# Patient Record
Sex: FEMALE | Race: OTHER | HISPANIC OR LATINO | URBAN - METROPOLITAN AREA
[De-identification: names, ages, dates, MRNs, and addresses within clinical notes are randomized per-mention and may not be internally consistent; named-entity substitution may affect disease eponyms.]

---

## 2019-07-28 ENCOUNTER — EMERGENCY (EMERGENCY)
Facility: HOSPITAL | Age: 54
LOS: 1 days | Discharge: HOME | End: 2019-07-28
Attending: EMERGENCY MEDICINE
Payer: MEDICAID

## 2019-07-28 VITALS
RESPIRATION RATE: 20 BRPM | HEART RATE: 73 BPM | OXYGEN SATURATION: 98 % | WEIGHT: 227.96 LBS | SYSTOLIC BLOOD PRESSURE: 155 MMHG | TEMPERATURE: 98 F | DIASTOLIC BLOOD PRESSURE: 78 MMHG | HEIGHT: 64 IN

## 2019-07-28 PROCEDURE — 99283 EMERGENCY DEPT VISIT LOW MDM: CPT

## 2019-07-28 NOTE — ED ADULT TRIAGE NOTE - CHIEF COMPLAINT QUOTE
c/o of bump to left eye x 6mos with increase in size and pain, itchiness, tearing, and associated HA

## 2019-07-29 PROCEDURE — T1013: CPT

## 2019-07-29 PROCEDURE — 99283 EMERGENCY DEPT VISIT LOW MDM: CPT

## 2019-07-29 RX ORDER — ERYTHROMYCIN BASE 5 MG/GRAM
1 OINTMENT (GRAM) OPHTHALMIC (EYE)
Qty: 1 | Refills: 0
Start: 2019-07-29 | End: 2019-08-02

## 2019-07-29 RX ORDER — ERYTHROMYCIN BASE 5 MG/GRAM
1 OINTMENT (GRAM) OPHTHALMIC (EYE) ONCE
Refills: 0 | Status: COMPLETED | OUTPATIENT
Start: 2019-07-29 | End: 2019-07-29

## 2019-07-29 RX ADMIN — Medication 1 APPLICATION(S): at 00:56

## 2019-07-29 NOTE — ED PROVIDER NOTE - OBJECTIVE STATEMENT
PT with no SPMHx presents to the ED with complaint of eye redness that started today. Pt states that she has been having a swollen non tender growth on the upper lid of her LT eye x6 month and this morning started to get thick yellowish drainage from it and localized LT eye redness. PT states that pain is mild mostly feels itchy, not improved or worse by anything, PT states that they have not done anything for the pain prior to coming to the ED. PT denies fever, chills, weakness, numbness, tingling, loss of vision, HA, dizziness.

## 2019-07-29 NOTE — ED PROVIDER NOTE - CLINICAL SUMMARY MEDICAL DECISION MAKING FREE TEXT BOX
PT with stable VS, no acute distress, non toxic appearing, tolerating PO in the ED, no acute vision loss, will dc home with abx drops, follow up to optho, educated about contact lens use, educated about when to return to the ED if needed. PT verbalizes that he understands all instructions and results.    utilized to obtain History, ROS, Physical Exam, explanations of results and plan of care, as well as follow up instructions.

## 2019-07-29 NOTE — ED PROVIDER NOTE - ATTENDING CONTRIBUTION TO CARE
I personally saw the patient with the PA, and completed the key components of the history and physical exam. I then discussed the management plan with the PA. General: NAD, EENT: Airway patent, Eyes: Left eyelid swelling, PERRL, EOMI
